# Patient Record
Sex: MALE | Race: WHITE
[De-identification: names, ages, dates, MRNs, and addresses within clinical notes are randomized per-mention and may not be internally consistent; named-entity substitution may affect disease eponyms.]

---

## 2017-10-10 ENCOUNTER — HOSPITAL ENCOUNTER (EMERGENCY)
Dept: HOSPITAL 62 - ER | Age: 72
Discharge: HOME | End: 2017-10-10
Payer: MEDICARE

## 2017-10-10 VITALS — DIASTOLIC BLOOD PRESSURE: 74 MMHG | SYSTOLIC BLOOD PRESSURE: 165 MMHG

## 2017-10-10 DIAGNOSIS — R10.13: Primary | ICD-10-CM

## 2017-10-10 DIAGNOSIS — R07.9: ICD-10-CM

## 2017-10-10 DIAGNOSIS — R11.0: ICD-10-CM

## 2017-10-10 LAB
ALBUMIN SERPL-MCNC: 4 G/DL (ref 3.5–5)
ALP SERPL-CCNC: 60 U/L (ref 38–126)
ALT SERPL-CCNC: 25 U/L (ref 21–72)
ANION GAP SERPL CALC-SCNC: 10 MMOL/L (ref 5–19)
APPEARANCE UR: CLEAR
AST SERPL-CCNC: 21 U/L (ref 17–59)
BASOPHILS # BLD AUTO: 0 10^3/UL (ref 0–0.2)
BASOPHILS NFR BLD AUTO: 0.6 % (ref 0–2)
BILIRUB DIRECT SERPL-MCNC: 0.4 MG/DL (ref 0–0.4)
BILIRUB SERPL-MCNC: 0.5 MG/DL (ref 0.2–1.3)
BILIRUB UR QL STRIP: NEGATIVE
BUN SERPL-MCNC: 23 MG/DL (ref 7–20)
CALCIUM: 9.2 MG/DL (ref 8.4–10.2)
CHLORIDE SERPL-SCNC: 108 MMOL/L (ref 98–107)
CK MB SERPL-MCNC: 1.39 NG/ML (ref ?–4.55)
CK SERPL-CCNC: 77 U/L (ref 55–170)
CO2 SERPL-SCNC: 26 MMOL/L (ref 22–30)
CREAT SERPL-MCNC: 1.32 MG/DL (ref 0.52–1.25)
EOSINOPHIL # BLD AUTO: 0.1 10^3/UL (ref 0–0.6)
EOSINOPHIL NFR BLD AUTO: 1.9 % (ref 0–6)
ERYTHROCYTE [DISTWIDTH] IN BLOOD BY AUTOMATED COUNT: 15.1 % (ref 11.5–14)
GLUCOSE SERPL-MCNC: 89 MG/DL (ref 75–110)
GLUCOSE UR STRIP-MCNC: NEGATIVE MG/DL
HCT VFR BLD CALC: 39 % (ref 37.9–51)
HGB BLD-MCNC: 13 G/DL (ref 13.5–17)
HGB HCT DIFFERENCE: 0
KETONES UR STRIP-MCNC: NEGATIVE MG/DL
LYMPHOCYTES # BLD AUTO: 1.7 10^3/UL (ref 0.5–4.7)
LYMPHOCYTES NFR BLD AUTO: 31.4 % (ref 13–45)
MCH RBC QN AUTO: 30.2 PG (ref 27–33.4)
MCHC RBC AUTO-ENTMCNC: 33.4 G/DL (ref 32–36)
MCV RBC AUTO: 90 FL (ref 80–97)
MONOCYTES # BLD AUTO: 0.3 10^3/UL (ref 0.1–1.4)
MONOCYTES NFR BLD AUTO: 5.8 % (ref 3–13)
NEUTROPHILS # BLD AUTO: 3.3 10^3/UL (ref 1.7–8.2)
NEUTS SEG NFR BLD AUTO: 60.3 % (ref 42–78)
NITRITE UR QL STRIP: NEGATIVE
PH UR STRIP: 6 [PH] (ref 5–9)
POTASSIUM SERPL-SCNC: 4.5 MMOL/L (ref 3.6–5)
PROT SERPL-MCNC: 7 G/DL (ref 6.3–8.2)
PROT UR STRIP-MCNC: NEGATIVE MG/DL
RBC # BLD AUTO: 4.32 10^6/UL (ref 4.35–5.55)
SODIUM SERPL-SCNC: 143.7 MMOL/L (ref 137–145)
SP GR UR STRIP: 1
TROPONIN I SERPL-MCNC: < 0.012 NG/ML
UROBILINOGEN UR-MCNC: NEGATIVE MG/DL (ref ?–2)
WBC # BLD AUTO: 5.4 10^3/UL (ref 4–10.5)

## 2017-10-10 PROCEDURE — 84484 ASSAY OF TROPONIN QUANT: CPT

## 2017-10-10 PROCEDURE — 74177 CT ABD & PELVIS W/CONTRAST: CPT

## 2017-10-10 PROCEDURE — 80053 COMPREHEN METABOLIC PANEL: CPT

## 2017-10-10 PROCEDURE — 82553 CREATINE MB FRACTION: CPT

## 2017-10-10 PROCEDURE — 99285 EMERGENCY DEPT VISIT HI MDM: CPT

## 2017-10-10 PROCEDURE — 93005 ELECTROCARDIOGRAM TRACING: CPT

## 2017-10-10 PROCEDURE — 36415 COLL VENOUS BLD VENIPUNCTURE: CPT

## 2017-10-10 PROCEDURE — 85025 COMPLETE CBC W/AUTO DIFF WBC: CPT

## 2017-10-10 PROCEDURE — 71010: CPT

## 2017-10-10 PROCEDURE — 81001 URINALYSIS AUTO W/SCOPE: CPT

## 2017-10-10 PROCEDURE — 93010 ELECTROCARDIOGRAM REPORT: CPT

## 2017-10-10 PROCEDURE — 82550 ASSAY OF CK (CPK): CPT

## 2017-10-10 NOTE — RADIOLOGY REPORT (SQ)
EXAM DESCRIPTION:  CT ABD/PELVIS WITH IV ONLY



COMPLETED DATE/TIME:  10/10/2017 3:07 pm



REASON FOR STUDY:  aortic aneurysm



COMPARISON:  CT abdomen pelvis 9/17/2009



TECHNIQUE:  CT scan of the abdomen and pelvis performed using helical scanning technique with dynamic
 intravenous contrast injection.  No oral contrast. Images reviewed with lung, soft tissue, and bone 
windows. Reconstructed coronal and sagittal MPR images reviewed. Delayed images for evaluation of the
 urinary system also acquired. All images stored on PACS.

All CT scanners at this facility use dose modulation, iterative reconstruction, and/or weight based d
osing when appropriate to reduce radiation dose to as low as reasonably achievable (ALARA).

[facility]: Dose Right  [facility]: CareDose    [facility]: Dose Right    [facility]: Teradose 4D    [facility]: [employer] Technologies



CONTRAST TYPE AND DOSE:  contrast/concentration: Isovue 370.00 mg/ml; Total Contrast Delivered: 79.0 
ml; Total Saline Delivered: 68.0 ml



RENAL FUNCTION:  Creatinine 1.3



RADIATION DOSE:  Up-to-date CT equipment and radiation dose reduction techniques were employed. CTDIv
ol: 6.4 - 8.9 mGy. DLP: 792 mGy-cm..



LIMITATIONS:  None.



FINDINGS:  LOWER CHEST: Pulmonary fibrosis at both lung bases.

LIVER: Normal size. No masses.  No dilated ducts.

SPLEEN: Normal size. No focal lesions.

PANCREAS: No masses. No significant calcifications. No adjacent inflammation or peripancreatic fluid 
collections. Pancreatic duct not dilated.

GALLBLADDER: No identified stones by CT criteria. No inflammatory changes to suggest cholecystitis.

ADRENAL GLANDS: No significant masses or asymmetry.

RIGHT KIDNEY AND URETER: No solid masses.   No significant calcifications.   No hydronephrosis or hyd
roureter.

LEFT KIDNEY AND URETER: No solid masses.   No significant calcifications.   No hydronephrosis or hydr
oureter.

AORTA AND VESSELS: Heavily calcified abdominal aorta and major branches with at least 50% bilateral p
roximal renal artery stenosis and 50% stenosis of the proximal celiac artery and superior mesenteric 
arteries.  There is an infrarenal abdominal aortic aneurysm measuring about 4.2 x 3.3 cm in size (was
 3 x 2.7 cm on 9/17/2009).

RETROPERITONEUM: No retroperitoneal adenopathy, hemorrhage or masses.

BOWEL AND PERITONEAL CAVITY: No masses or inflammatory changes. No free fluid or peritoneal masses.  
Descending and sigmoid colon diverticuli without CT signs of acute diverticulitis.

APPENDIX: Normal.

PELVIS: No mass.  No free fluid. Normal bladder.

ABDOMINAL WALL: No masses. No hernias.

BONES: No significant or acute findings.

OTHER: No other significant finding.



IMPRESSION:  Unruptured infrarenal abdominal aortic aneurysm as above.

Atherosclerotic visceral artery narrowing as above.



TECHNICAL DOCUMENTATION:  JOB ID:  [id number]

Quality ID # 436: Final reports with documentation of one or more dose reduction techniques (e.g., Au
tomated exposure control, adjustment of the mA and/or kV according to patient size, use of iterative 
reconstruction technique)

 2011 [employer]- All Rights Reserved

## 2017-10-10 NOTE — ER DOCUMENT REPORT
ED General





- General


Chief Complaint: Chest Pain


Stated Complaint: CHEST PAIN


Time Seen by Provider: 10/10/17 13:23


Mode of Arrival: Ambulatory


Information source: Patient


Notes: 





72-year-old of gastric reflux on Nexium daily presents with complaints of 

epigastric burning sensation.  Patient denies any actual chest pain states his 

epigastric region denies any fevers or chills.  Patient had a heart 

catheterization recently and has a history of aortic aneurysm


Patient notes he has had a few episodes of gastric reflux which resolved with 

Nexium and today's did resolve with Mylanta


TRAVEL OUTSIDE OF THE [place] IN LAST 30 DAYS: No





- HPI


Onset: Just prior to arrival


Onset/Duration: Sudden


Quality of pain: Burning


Severity: Mild


Pain Level: 1


Associated symptoms: Nausea


Exacerbated by: Denies


Relieved by: Other - Mylanta


Similar symptoms previously: Yes


Recently seen / treated by doctor: Yes





- Related Data


Allergies/Adverse Reactions: 


 





No Known Allergies Allergy (Verified 10/10/17 13:24)


 








Home Medications: 


 Current Home Medications





Calcium Carbonate [Calcium] 600 mg PO DAILY 10/10/17 [History]


Cholecalciferol (Vitamin D3) [Vitamin D3 1000 Unit Tablet] 1 tab PO DAILY 10/10/

17 [History]


Esomeprazole Magnesium [Nexium] 1 tab PO DAILY 10/10/17 [History]


Lisinopril [Lisinopril] 5 mg PO DAILY 10/10/17 [History]


Loperamide HCl [Loperamide] 1 tab PO DAILY PRN 10/10/17 [History]


Metoprolol Tartrate [Metoprolol Tartrate] 1 tab PO DAILY 10/10/17 [History]


Nintedanib Esylate [Ofev] 150 mg PO BID 10/10/17 [History]











Past Medical History





- Social History


Smoking Status: Never Smoker


Cigarette use (# per day): No


Chew tobacco use (# tins/day): No


Smoking Education Provided: No


Family History: Reviewed & Not Pertinent





- Past Medical History


Cardiac Medical History: 


   Denies: Hx Coronary Artery Disease, Hx Heart Attack, Hx Hypertension


Pulmonary Medical History: 


   Denies: Hx Asthma, Hx Bronchitis, Hx COPD, Hx Pneumonia


Neurological Medical History: Denies: Hx Cerebrovascular Accident, Hx Seizures


GI Medical History: Reports: Hx Gastroesophageal Reflux Disease


Musculoskeltal Medical History: Reports Hx Arthritis





- Immunizations


Hx Diphtheria, Pertussis, Tetanus Vaccination: Yes


Hx Pneumococcal Vaccination: 01/01/11





Review of Systems





- Review of Systems


Notes: 





REVIEW OF SYSTEMS:


CONSTITUTIONAL :  Denies fever,  chills, or sweats.  Denies recent illness.


EENT:   Denies eye, ear, throat, or mouth pain or symptoms.  Denies nasal or 

sinus congestion or discharge.  Denies throat, tongue, or mouth swelling or 

difficulty swallowing.


CARDIOVASCULAR:  Denies chest pain.  Denies palpitations or racing or irregular 

heart beat.  Denies ankle edema.


RESPIRATORY:  Denies cough, cold, or chest congestion.  Denies shortness of 

breath, difficulty breathing, or wheezing.


GASTROINTESTINAL:  admits to epigastric pain 


GENITOURINARY:  Denies difficulty urinating, painful urination, burning, 

frequency, blood in urine, or discharge.


MUSCULOSKELETAL:  Denies back or neck pain or stiffness.  Denies joint pain or 

swelling.


SKIN:   Denies rash, lesions or sores.


HEMATOLOGIC :   Denies easy bruising or bleeding.


LYMPHATIC:  Denies swollen, enlarged glands.


NEUROLOGICAL:  Denies confusion or altered mental status.  Denies passing out 

or loss of consciousness.  Denies dizziness or lightheadedness.  Denies 

headache.  Denies weakness or paralysis or loss of use of either side.  Denies 

problems with gait or speech.  Denies sensory loss, numbness, or tingling.  

Denies seizures.


PSYCHIATRIC:  Denies anxiety or stress.  Denies depression, suicidal ideation, 

or homicidal ideation.





ALL OTHER SYSTEMS REVIEWED AND NEGATIVE.





Dictation was performed using Dragon voice recognition software 





PHYSICAL EXAMINATION:





GENERAL: Well-appearing, well-nourished and in no acute distress.





HEAD: Atraumatic, normocephalic.





EYES: Pupils equal round and reactive to light, extraocular movements intact, 

sclera anicteric, conjunctiva are normal.





ENT: Nares patent, oropharynx clear without exudates.  Moist mucous membranes.





NECK: Normal range of motion, supple without lymphadenopathy





LUNGS: Breath sounds clear to auscultation bilaterally and equal.  No wheezes 

rales or rhonchi.





HEART: Regular rate and rhythm without murmurs





ABDOMEN: Soft tender i nthe epigastric region, no rebound or guarding 





Musculoskeletal: Normal range of motion, no pitting or edema.  No cyanosis.





NEUROLOGICAL: Cranial nerves grossly intact.  Normal speech, normal gait.  

Normal sensory, motor exams 





PSYCH: Normal mood, normal affect.





SKIN: Warm, Dry, normal turgor, no rashes or lesions noted.





Physical Exam





- Vital signs


Vitals: 


 











Resp Pulse Ox


 


 18   99 


 


 10/10/17 13:15  10/10/17 13:15














Course





- Re-evaluation


Re-evalutation: 





10/10/17 14:54


new keyona page 


10/10/17 14:55


heart cath 2/16 notes normal , no valvular disease , left main 20-30%


10/10/17 15:48


CT did note the aneurysm no dissections noted I believe patient is stable for 

discharge








After performing a Medical Screening Examination, I estimate there is LOW risk 

for ACUTE APPENDICITIS, BOWEL OBSTRUCTION, ACUTE CHOLECYSTITIS, PERFORATED 

DIVERTICULITIS, INCARCERATED HERNIA, PANCREATITIS, TESTICULAR TORSION or 

PERFORATED ULCER, thus I consider the discharge disposition reasonable. Also, 

there is no evidence or peritonitis, sepsis, or toxicity.  I have reevaluated 

this patient multiple times and no significant life threatening changes are 

noted. The patient and I have discussed the diagnosis and risks, and we agree 

with discharging home with close follow-up with the understanding that symptoms 

and presentations can change. We also discussed returning to the Emergency 

Department immediately if new or worsening symptoms occur. We have discussed 

the symptoms which are most concerning (e.g., bloody stool, fever, changing or 

worsening pain, intractable vomiting - standard verbal up date) that 

necessitate immediate return.





- Vital Signs


Vital signs: 


 











Temp Pulse Resp BP Pulse Ox


 


 97.9 F      16   184/72 H  99 


 


 10/10/17 13:23     10/10/17 15:33  10/10/17 13:23  10/10/17 15:33














- Laboratory


Result Diagrams: 


 10/10/17 13:34





 10/10/17 13:34


Laboratory results interpreted by me: 


 











  10/10/17 10/10/17 10/10/17





  13:34 13:34 13:40


 


RBC  4.32 L  


 


Hgb  13.0 L  


 


RDW  15.1 H  


 


Plt Count  115 L  


 


Chloride   108 H 


 


BUN   23 H 


 


Creatinine   1.32 H 


 


Est GFR (Non-Af Amer)   53 L 


 


Urine Blood    MODERATE H














- Diagnostic Test


Radiology reviewed: Image reviewed, Reports reviewed





Discharge





- Discharge


Clinical Impression: 


 Epigastric pain





Condition: Stable


Disposition: HOME, SELF-CARE


Instructions:  Abdominal Pain ([facility])


Prescriptions: 


Famotidine [Pepcid 40 mg Tablet] 40 mg PO DAILY #90 tablet


Referrals: 


ASHLEY BALTAZAR MD [[facility]] - Follow up tomorrow

## 2017-10-10 NOTE — RADIOLOGY REPORT (SQ)
EXAM DESCRIPTION:  CHEST SINGLE VIEW



COMPLETED DATE/TIME:  10/10/2017 1:37 pm



REASON FOR STUDY:  cp



COMPARISON:  1/27/2014, 1/5/2012



EXAM PARAMETERS:  NUMBER OF VIEWS: One view.

TECHNIQUE: Single frontal radiographic view of the chest acquired.

RADIATION DOSE: NA

LIMITATIONS: None.



FINDINGS:  LUNGS AND PLEURA: Increased interstitial markings around the periphery of both lungs, like
ly representing a combination of pulmonary fibrosis and minimal interstitial edema.  Set

No pleural effusion.  No pneumothorax.  No dense consolidation worrisome for pneumonia.

MEDIASTINUM AND HILAR STRUCTURES: No masses.  Contour normal.

HEART AND VASCULAR STRUCTURES: Heart normal in size.  Normal vasculature.

BONES: Osteopenic

HARDWARE: None in the chest.

OTHER: No other significant finding.



IMPRESSION:  Increased interstitial markings around the periphery of both lungs, likely a combination
 of interstitial pulmonary fibrosis and mild interstitial edema.



TECHNICAL DOCUMENTATION:  JOB ID:  [id number]

## 2020-09-22 ENCOUNTER — HOSPITAL ENCOUNTER (OUTPATIENT)
Dept: HOSPITAL 62 - OD | Age: 75
End: 2020-09-22
Attending: INTERNAL MEDICINE
Payer: MEDICARE

## 2020-09-22 DIAGNOSIS — R10.13: Primary | ICD-10-CM
